# Patient Record
Sex: FEMALE | Race: BLACK OR AFRICAN AMERICAN | NOT HISPANIC OR LATINO | Employment: UNEMPLOYED | URBAN - METROPOLITAN AREA
[De-identification: names, ages, dates, MRNs, and addresses within clinical notes are randomized per-mention and may not be internally consistent; named-entity substitution may affect disease eponyms.]

---

## 2024-05-12 ENCOUNTER — HOSPITAL ENCOUNTER (EMERGENCY)
Facility: CLINIC | Age: 39
Discharge: STILL ADMITTED OR ADMINISTRATIVE | End: 2024-05-12
Payer: COMMERCIAL

## 2024-05-12 ENCOUNTER — HOSPITAL ENCOUNTER (OUTPATIENT)
Facility: CLINIC | Age: 39
End: 2024-05-12
Admitting: OBSTETRICS & GYNECOLOGY
Payer: COMMERCIAL

## 2024-05-12 ENCOUNTER — APPOINTMENT (OUTPATIENT)
Dept: GENERAL RADIOLOGY | Facility: CLINIC | Age: 39
End: 2024-05-12
Attending: EMERGENCY MEDICINE
Payer: COMMERCIAL

## 2024-05-12 ENCOUNTER — HOSPITAL ENCOUNTER (EMERGENCY)
Facility: CLINIC | Age: 39
Discharge: HOME | End: 2024-05-12
Attending: EMERGENCY MEDICINE | Admitting: EMERGENCY MEDICINE
Payer: COMMERCIAL

## 2024-05-12 ENCOUNTER — HOSPITAL ENCOUNTER (OUTPATIENT)
Facility: CLINIC | Age: 39
Discharge: HOME | End: 2024-05-12
Attending: OBSTETRICS & GYNECOLOGY | Admitting: OBSTETRICS & GYNECOLOGY
Payer: COMMERCIAL

## 2024-05-12 VITALS
WEIGHT: 190 LBS | HEART RATE: 96 BPM | DIASTOLIC BLOOD PRESSURE: 65 MMHG | BODY MASS INDEX: 30.53 KG/M2 | SYSTOLIC BLOOD PRESSURE: 108 MMHG | TEMPERATURE: 97.9 F | HEIGHT: 66 IN | RESPIRATION RATE: 14 BRPM

## 2024-05-12 VITALS
HEART RATE: 75 BPM | TEMPERATURE: 98.2 F | RESPIRATION RATE: 19 BRPM | SYSTOLIC BLOOD PRESSURE: 113 MMHG | OXYGEN SATURATION: 97 % | DIASTOLIC BLOOD PRESSURE: 68 MMHG

## 2024-05-12 DIAGNOSIS — S80.01XA CONTUSION OF RIGHT KNEE, INITIAL ENCOUNTER: ICD-10-CM

## 2024-05-12 DIAGNOSIS — S39.012A LUMBAR STRAIN, INITIAL ENCOUNTER: ICD-10-CM

## 2024-05-12 DIAGNOSIS — S16.1XXA CERVICAL STRAIN, INITIAL ENCOUNTER: ICD-10-CM

## 2024-05-12 PROCEDURE — G0463 HOSPITAL OUTPT CLINIC VISIT: HCPCS

## 2024-05-12 PROCEDURE — 99284 EMERGENCY DEPT VISIT MOD MDM: CPT

## 2024-05-12 PROCEDURE — 72040 X-RAY EXAM NECK SPINE 2-3 VW: CPT

## 2024-05-12 PROCEDURE — 73560 X-RAY EXAM OF KNEE 1 OR 2: CPT | Mod: RT

## 2024-05-12 RX ORDER — PROCHLORPERAZINE MALEATE 5 MG
10 TABLET ORAL EVERY 6 HOURS PRN
Status: DISCONTINUED | OUTPATIENT
Start: 2024-05-12 | End: 2024-05-12 | Stop reason: HOSPADM

## 2024-05-12 RX ORDER — FOLIC ACID 20 MG
1 CAPSULE ORAL DAILY
COMMUNITY

## 2024-05-12 RX ORDER — METOCLOPRAMIDE 10 MG/1
10 TABLET ORAL EVERY 6 HOURS PRN
Status: DISCONTINUED | OUTPATIENT
Start: 2024-05-12 | End: 2024-05-12 | Stop reason: HOSPADM

## 2024-05-12 RX ORDER — ONDANSETRON 4 MG/1
4 TABLET, ORALLY DISINTEGRATING ORAL EVERY 6 HOURS PRN
Status: DISCONTINUED | OUTPATIENT
Start: 2024-05-12 | End: 2024-05-12 | Stop reason: HOSPADM

## 2024-05-12 RX ORDER — ASPIRIN 81 MG/1
81 TABLET ORAL DAILY
COMMUNITY

## 2024-05-12 RX ORDER — ONDANSETRON 2 MG/ML
4 INJECTION INTRAMUSCULAR; INTRAVENOUS EVERY 6 HOURS PRN
Status: DISCONTINUED | OUTPATIENT
Start: 2024-05-12 | End: 2024-05-12 | Stop reason: HOSPADM

## 2024-05-12 RX ORDER — PROCHLORPERAZINE 25 MG
25 SUPPOSITORY, RECTAL RECTAL EVERY 12 HOURS PRN
Status: DISCONTINUED | OUTPATIENT
Start: 2024-05-12 | End: 2024-05-12 | Stop reason: HOSPADM

## 2024-05-12 RX ORDER — METOCLOPRAMIDE HYDROCHLORIDE 5 MG/ML
10 INJECTION INTRAMUSCULAR; INTRAVENOUS EVERY 6 HOURS PRN
Status: DISCONTINUED | OUTPATIENT
Start: 2024-05-12 | End: 2024-05-12 | Stop reason: HOSPADM

## 2024-05-12 ASSESSMENT — ACTIVITIES OF DAILY LIVING (ADL)
ADLS_ACUITY_SCORE: 35
ADLS_ACUITY_SCORE: 35
ADLS_ACUITY_SCORE: 18

## 2024-05-12 NOTE — DISCHARGE INSTRUCTIONS
Learning About When to Call Your Doctor During Pregnancy (After 20 Weeks)  Overview  It's common to have concerns about what might be a problem when you're pregnant. Most pregnancies don't have any serious problems. But it's still important to know when to call your doctor if you have certain symptoms or signs of labor.  These are general suggestions. Your doctor may give you some more information about when to call.  When to call your doctor (after 20 weeks)  Call 911  anytime you think you may need emergency care. For example, call if:  You have severe vaginal bleeding. This means you are soaking through a pad each hour for 2 or more hours.  You have sudden, severe pain in your belly.  You have chest pain, are short of breath, or cough up blood.  You passed out (lost consciousness).  You have a seizure.  You see or feel the umbilical cord.  You think you are about to deliver your baby and can't make it safely to the hospital or birthing center.  Call your doctor now or seek immediate medical care if:  You have vaginal bleeding.  You have belly pain.  You have a fever.  You are dizzy or lightheaded, or you feel like you may faint.  You have signs of a blood clot in your leg (called a deep vein thrombosis), such as:  Pain in the calf, back of the knee, thigh, or groin.  Swelling in your leg or groin.  A color change on the leg or groin. The skin may be reddish or purplish, depending on your usual skin color.  You have symptoms of preeclampsia, such as:  Sudden swelling of your face, hands, or feet.  New vision problems (such as dimness, blurring, or seeing spots).  A severe headache.  You have a sudden release of fluid from your vagina. (You think your water broke.)  You've been having regular contractions for an hour. This means that you've had at least 6 contractions within 1 hour, even after you change your position and drink fluids.  You notice that your baby has stopped moving or is moving less than  "normal.  You have signs of heart failure, such as:  New or increased shortness of breath.  New or worse swelling in your legs, ankles, or feet.  Sudden weight gain, such as more than 2 to 3 pounds in a day or 5 pounds in a week.  Feeling so tired or weak that you cannot do your usual activities.  You have symptoms of a urinary tract infection. These may include:  Pain or burning when you urinate.  A frequent need to urinate without being able to pass much urine.  Pain in the flank, which is just below the rib cage and above the waist on either side of the back.  Blood in your urine.  Watch closely for changes in your health, and be sure to contact your doctor if:  You have vaginal discharge that smells bad.  You feel sad, anxious, or hopeless for more than a few days.  You have skin changes, such as a rash, itching, or a yellow color to your skin.  You have other concerns about your pregnancy.  If you have labor signs at 37 weeks or more  If you have signs of labor at 37 weeks or more, your doctor may tell you to call when your labor becomes more active. Symptoms of active labor include:  Contractions that are regular.  Contractions that are less than 5 minutes apart.  Contractions that are hard to talk through.  Follow-up care is a key part of your treatment and safety. Be sure to make and go to all appointments, and call your doctor if you are having problems. It's also a good idea to know your test results and keep a list of the medicines you take.  Where can you learn more?  Go to https://www.[employer].net/patiented  Enter N531 in the search box to learn more about \"Learning About When to Call Your Doctor During Pregnancy (After 20 Weeks).\"  Current as of: July 10, 2023               Content Version: 14.0    9054-2336 [employer].   Care instructions adapted under license by your healthcare professional. If you have questions about a medical condition or this instruction, always ask your healthcare " professional. [employer] disclaims any warranty or liability for your use of this information.

## 2024-05-12 NOTE — PROVIDER NOTIFICATION
Pt arrives stating that she slipped and fell yesterday afternoon [place] the mall. She states she fell on her right knee and hip. She denies leaking of fluid and vaginal bleeding and states her baby has been active. External monitors applied with verbal consent. Admission intake done. No bruises or swelling noted to pt's right knee or hip. Pt states she has taken some tylenol for discomfort.    Dr Quintanilla notified via phone of pt's arrival, fall, s/s. New orders to discharge pt from [facility]. Pt may be seen in ED for knee and hip pain if needed.    Pt discharged in stable condition. Discharge instructions and paperwork given. Pt denies further questions.

## 2024-05-12 NOTE — ED PROVIDER NOTES
History     Chief Complaint:  Fall       HPI   Diaz Cooley is a 38 year old female who presents to the ED following a fall yesterday.  She says she stepped on some water on the floor.  She is initially seen by paramedics when she decided not to come to the hospital.  She complains of pain mainly over the right knee anteriorly as well as over the right cervical paraspinal muscles.  She does have some pain over the right lumbar paraspinal muscles.  She is also approximate 23 weeks pregnant.  She is not having any vaginal bleeding but complained of some abdominal pain.  She has already been seen in the OB unit and was cleared with respect to pregnancy.    No midline tenderness in the neck or back.  No radicular pain into the legs.  No hematuria.      Review of External Notes:  OB notes reviewed.  The patient was cleared from the standpoint of her pregnancy.    Medications:    aspirin 81 MG EC tablet  folic acid 20 MG CAPS  Prenatal Vit-Fe Fumarate-FA (PRENATAL MULTIVITAMIN  PLUS IRON) 27-1 MG TABS        Past Medical History:    No past medical history on file.    Past Surgical History:    No past surgical history on file.       Physical Exam   Patient Vitals for the past 24 hrs:   BP Temp Temp src Pulse Resp SpO2   05/12/24 1516 113/68 -- -- -- -- --   05/12/24 1513 -- 98.2  F (36.8  C) Temporal 75 19 97 %        Physical Exam  Constitutional:       General: She is not in acute distress.     Appearance: Normal appearance. She is not diaphoretic.   HENT:      Head: Atraumatic.      Mouth/Throat:      Mouth: Mucous membranes are moist.   Eyes:      General: No scleral icterus.     Conjunctiva/sclera: Conjunctivae normal.   Cardiovascular:      Rate and Rhythm: Normal rate and regular rhythm.      Heart sounds: Normal heart sounds.   Pulmonary:      Effort: No respiratory distress.      Breath sounds: Normal breath sounds.   Abdominal:      General: Abdomen is flat. There is no distension.      Comments: Gravid  uterus.  Nontender.   Musculoskeletal:      Cervical back: Neck supple.      Comments: There is tenderness of the right cervical paraspinal muscles.  Mild tenderness over the right lumbar paraspinal muscles.  No step-off or midline tenderness in the cervical, thoracic, or lumbar spine.  No tenderness of the right hip.  Full range of motion of the right hip without any pain elicited.  There is a contusion with tenderness over the anterior right knee.  No ligamentous laxity.  No tenderness of the right ankle or foot.  Pulses in the foot normal.   Skin:     General: Skin is warm.      Findings: No rash.   Neurological:      General: No focal deficit present.      Mental Status: She is alert and oriented to person, place, and time.      Comments: Gait is normal.  Strength and sensation intact to the feet   Psychiatric:         Mood and Affect: Mood normal.         Behavior: Behavior normal.           Emergency Department Course     Imaging:  XR Knee Right 1/2 Views   Final Result   IMPRESSION:       Views of the right knee are negative for acute fracture or dislocation. Normal joint spacing. No sizable joint effusion.      XR Cervical Spine 2/3 Views   Final Result   IMPRESSION: Normal vertebral body heights. Straightening of cervical lordosis. No definite fracture. Normal prevertebral soft tissues. Normal extraspinal structures.             Emergency Department Course & Assessments:    Independent Interpretation (X-rays, CTs, rhythm strip):  X-ray of the right knee reviewed.  No fracture.     Disposition:  The patient was discharged.    Impression & Plan       Medical Decision Making:  This patient is a 38-year-old who presents to the emergency department for evaluation of musculoskeletal pain following a fall yesterday.  She is in her second trimester pregnancy.  X-ray of the right knee is negative.  C-spine x-ray is negative.  We discussed potentially doing an x-ray of the lumbar spine, but this would expose her baby  to radiation.  She feels comfortable holding off on the x-ray given that the pain is more over the paraspinal muscles and not in the midline.  There is no radiation of the pain into the legs and no neurologic deficit.  She will follow-up in the short-term with her primary care physician.  If she has persistent symptoms then imaging can be considered.      Diagnosis:    ICD-10-CM    1. Cervical strain, initial encounter  S16.1XXA       2. Lumbar strain, initial encounter  S39.012A       3. Contusion of right knee, initial encounter  S80.01XA          5/12/2024   Everardo Osei MD McRoberts, Sean Edward, MD  05/12/24 1706

## 2024-05-12 NOTE — DISCHARGE INSTRUCTIONS
Return to the ER for new or worsening symptoms.    You should plan to rest and avoid any heavy lifting or strenuous exertion for the next several days.

## 2024-05-12 NOTE — ED TRIAGE NOTES
Pt presents to ed to be evaluated for a fall.   Pt states that yesterday she was at the mall, and the floor was wet causing her to fall. Paramedics were called to scene, took her vitals, and she decided not to go to the hospital because she felt fine.Since they, she has had increased pain. Pt reports pain in: R knee, and down her leg, and then also her R side, shoulder, and neck, where she landed.   Pt denies any LOC, and states she didn't hit her head.   Pt is 23 weeks pregnant, and was [place] [facility] and cleared to come down to ER for evaluation.   Pt ambulatory in ED.    Triage Assessment (Adult)       Row Name 05/12/24 1514          Triage Assessment    Airway WDL WDL        Respiratory WDL    Respiratory WDL WDL        Peripheral/Neurovascular WDL    Peripheral Neurovascular WDL WDL